# Patient Record
Sex: FEMALE | Employment: UNEMPLOYED | URBAN - METROPOLITAN AREA
[De-identification: names, ages, dates, MRNs, and addresses within clinical notes are randomized per-mention and may not be internally consistent; named-entity substitution may affect disease eponyms.]

---
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
## 2024-07-10 ENCOUNTER — TELEPHONE (OUTPATIENT)
Dept: FAMILY MEDICINE CLINIC | Age: 24
End: 2024-07-10

## 2024-07-10 NOTE — TELEPHONE ENCOUNTER
----- Message from Nolberto Murray sent at 7/10/2024 11:39 AM EDT -----  Regarding: [facility] Appointment Request  [facility] Appointment Request    Patient needs appointment for [facility] Appointment Type: New to Provider. New patient ,Sports physical    Patient Requested Dates(s): Before Aug 5 , Aug 1st will be preferable   Patient Requested Time:  afternoon   Provider Name: Madeleine Aguilar    Reason for Appointment Request: New Patient - Requested Provider unavailable   --------------------------------------------------------------------------------------------------------------------------    Relationship to Patient: Third Party Mother Shante Knowles     Call Back Information: OK to leave message on voicemail  Preferred Call Back Number: Phone [phone]

## 2024-07-10 NOTE — TELEPHONE ENCOUNTER
Spoke with Hilda, as she was requesting to Establish with you but that she has an appointment with Dr Delgado later this month. Her hope was that she could get Established with you - I did let her know that we would have to check with you & then could call her back with your decision.     Did also let her know, that even if you were accepting, we did not have anything in the early August time frame like she was requesting. 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    We are leaving the appointment with Dr Delgado on there for now, in case she can not see you. Please advise.

## 2024-07-10 NOTE — TELEPHONE ENCOUNTER
I have one single appointment available for a new patient on August 1st.  The 3PM slot is available if she'd like it.  Otherwise, will have to be schedule on her school break

## 2024-08-01 SDOH — HEALTH STABILITY: PHYSICAL HEALTH: ON AVERAGE, HOW MANY DAYS PER WEEK DO YOU ENGAGE IN MODERATE TO STRENUOUS EXERCISE (LIKE A BRISK WALK)?: 6 DAYS

## 2024-08-01 SDOH — HEALTH STABILITY: PHYSICAL HEALTH: ON AVERAGE, HOW MANY MINUTES DO YOU ENGAGE IN EXERCISE AT THIS LEVEL?: 90 MIN

## 2024-08-02 ENCOUNTER — OFFICE VISIT (OUTPATIENT)
Dept: FAMILY MEDICINE CLINIC | Age: 24
End: 2024-08-02
Payer: COMMERCIAL

## 2024-08-02 VITALS
SYSTOLIC BLOOD PRESSURE: 118 MMHG | BODY MASS INDEX: 21.86 KG/M2 | TEMPERATURE: 97.8 F | OXYGEN SATURATION: 98 % | DIASTOLIC BLOOD PRESSURE: 68 MMHG | HEART RATE: 67 BPM | RESPIRATION RATE: 18 BRPM | WEIGHT: 136 LBS | HEIGHT: 66 IN

## 2024-08-02 DIAGNOSIS — Z00.00 ENCOUNTER FOR WELL ADULT EXAM WITHOUT ABNORMAL FINDINGS: Primary | ICD-10-CM

## 2024-08-02 PROBLEM — I25.3 PFO WITH ATRIAL SEPTAL ANEURYSM: Status: ACTIVE | Noted: 2020-01-21

## 2024-08-02 PROBLEM — Q21.12 PFO WITH ATRIAL SEPTAL ANEURYSM: Status: ACTIVE | Noted: 2020-01-21

## 2024-08-02 PROCEDURE — 99385 PREV VISIT NEW AGE 18-39: CPT | Performed by: FAMILY MEDICINE

## 2024-08-02 RX ORDER — DROSPIRENONE AND ESTETROL 3-14.2(28)
1 KIT ORAL DAILY
COMMUNITY

## 2024-08-02 SDOH — ECONOMIC STABILITY: FOOD INSECURITY: WITHIN THE PAST 12 MONTHS, YOU WORRIED THAT YOUR FOOD WOULD RUN OUT BEFORE YOU GOT MONEY TO BUY MORE.: NEVER TRUE

## 2024-08-02 SDOH — ECONOMIC STABILITY: HOUSING INSECURITY
IN THE LAST 12 MONTHS, WAS THERE A TIME WHEN YOU DID NOT HAVE A STEADY PLACE TO SLEEP OR SLEPT IN A SHELTER (INCLUDING NOW)?: NO

## 2024-08-02 SDOH — ECONOMIC STABILITY: INCOME INSECURITY: HOW HARD IS IT FOR YOU TO PAY FOR THE VERY BASICS LIKE FOOD, HOUSING, MEDICAL CARE, AND HEATING?: NOT HARD AT ALL

## 2024-08-02 SDOH — ECONOMIC STABILITY: FOOD INSECURITY: WITHIN THE PAST 12 MONTHS, THE FOOD YOU BOUGHT JUST DIDN'T LAST AND YOU DIDN'T HAVE MONEY TO GET MORE.: NEVER TRUE

## 2024-08-02 ASSESSMENT — ENCOUNTER SYMPTOMS
COUGH: 0
VOMITING: 0
SHORTNESS OF BREATH: 0
NAUSEA: 0
WHEEZING: 0
BACK PAIN: 0
CONSTIPATION: 0
DIARRHEA: 0
ABDOMINAL PAIN: 0

## 2024-08-02 ASSESSMENT — PATIENT HEALTH QUESTIONNAIRE - PHQ9
SUM OF ALL RESPONSES TO PHQ QUESTIONS 1-9: 0
2. FEELING DOWN, DEPRESSED OR HOPELESS: NOT AT ALL
SUM OF ALL RESPONSES TO PHQ QUESTIONS 1-9: 0
1. LITTLE INTEREST OR PLEASURE IN DOING THINGS: NOT AT ALL
SUM OF ALL RESPONSES TO PHQ9 QUESTIONS 1 & 2: 0

## 2024-08-02 NOTE — PROGRESS NOTES
24  Hilda Knowles : 2000 Sex: female  Age: 23 y.o.    Chief Complaint   Patient presents with    New Patient     HPI:  23 y.o. female presents today to establish care with a new PCP.  Previously seen by Dr. Bray.  Patient's chart, medical, surgical and medication history all reviewed.    Well Adult Physical  Patient here for a physical exam.  The patient reports no problems.    Do you take any herbs or supplements that were not prescribed by a doctor? no   Are you taking calcium supplements? no   Are you taking aspirin daily? no    Colonoscopy: No colonoscopy on file   No cologuard on file  No FIT/FOBT on file   No flexible sigmoidoscopy on file  Dental visit: Within last 6 mos  Vision check:  No Problems    Last time routine bloodwork was done: several years    Immunization status: up to date and documented.    Smoking status: never    Physical activity:  running , frequently    ROS:  Review of Systems   Constitutional:  Negative for chills, fatigue and fever.   Respiratory:  Negative for cough, shortness of breath and wheezing.    Cardiovascular:  Negative for chest pain and palpitations.   Gastrointestinal:  Negative for abdominal pain, constipation, diarrhea, nausea and vomiting.   Musculoskeletal:  Negative for arthralgias and back pain.   Skin:  Negative for rash.   Neurological:  Negative for dizziness and headaches.   Psychiatric/Behavioral:  Negative for dysphoric mood. The patient is not nervous/anxious.    All other systems reviewed and are negative.     Current Outpatient Medications on File Prior to Visit   Medication Sig Dispense Refill    NEXTSTELLIS 3-14.2 MG TABS Take 1 tablet by mouth daily       No current facility-administered medications on file prior to visit.       No Known Allergies    History reviewed. No pertinent past medical history.  Past Surgical History:   Procedure Laterality Date    TONSILLECTOMY       Family History   Problem Relation Age of Onset    No Known

## 2025-02-10 ENCOUNTER — TELEPHONE (OUTPATIENT)
Dept: FAMILY MEDICINE CLINIC | Age: 25
End: 2025-02-10

## 2025-02-10 NOTE — TELEPHONE ENCOUNTER
I don't see any openings on your schedule today; would you like us to recommend [facility], or can you see Hilda?  -------------------------------------------------------------  Appointment Request From: Hilda Knowles     With Provider: Dr. Madeleine Aguilar DO [[facility]]     Preferred Date Range: 2/10/2025 - 2/10/2025     Preferred Times: Monday Morning     Reason for visit: Request an Appointment     Comments:  stomach pain

## 2025-02-10 NOTE — TELEPHONE ENCOUNTER
Unfortunately, I don't have anything this week with my short schedule.  I would have her come through [facility]

## 2025-02-10 NOTE — TELEPHONE ENCOUNTER
Spoke with Hilda; let her know that Dr Aguilar did not have any openings, but that she would recommend [facility] for her to be evaluated. [facility] hours provided. Hilda verbalized agreement.